# Patient Record
Sex: FEMALE | Race: BLACK OR AFRICAN AMERICAN | NOT HISPANIC OR LATINO | Employment: OTHER | URBAN - METROPOLITAN AREA
[De-identification: names, ages, dates, MRNs, and addresses within clinical notes are randomized per-mention and may not be internally consistent; named-entity substitution may affect disease eponyms.]

---

## 2017-08-04 ENCOUNTER — HOSPITAL ENCOUNTER (EMERGENCY)
Facility: HOSPITAL | Age: 47
Discharge: HOME | End: 2017-08-04
Payer: COMMERCIAL

## 2017-08-04 VITALS
RESPIRATION RATE: 16 BRPM | WEIGHT: 191 LBS | HEART RATE: 81 BPM | OXYGEN SATURATION: 98 % | BODY MASS INDEX: 29.98 KG/M2 | TEMPERATURE: 98 F | DIASTOLIC BLOOD PRESSURE: 84 MMHG | SYSTOLIC BLOOD PRESSURE: 167 MMHG | HEIGHT: 67 IN

## 2017-08-04 DIAGNOSIS — S60.459A FOREIGN BODY IN SKIN OF FINGER, INITIAL ENCOUNTER: Primary | ICD-10-CM

## 2017-08-04 DIAGNOSIS — S69.92XA: ICD-10-CM

## 2017-08-04 PROCEDURE — 10120 INC&RMVL FB SUBQ TISS SMPL: CPT | Mod: F1

## 2017-08-04 PROCEDURE — 99283 EMERGENCY DEPT VISIT LOW MDM: CPT | Mod: 25

## 2017-08-04 RX ORDER — LEVONORGESTREL / ETHINYL ESTRADIOL AND ETHINYL ESTRADIOL 150-30(84)
KIT ORAL DAILY
COMMUNITY

## 2017-08-04 NOTE — ED PROVIDER NOTES
Encounter Date: 8/4/2017       History     Chief Complaint   Patient presents with    Foreign Body in Skin     Fishing hook to 2nd digit on L hand. Tetanus UTD     45 yo BF c/o fish hook to left index finger distal phalanx about 2 hours ago.     Pt on vacation from [place] and is a [employer]et    Tetanus shot about 1 year ago      The history is provided by the patient.   Foreign Body    The current episode started 1 to 2 hours ago. The incident was witnessed. The incident was witnessed/reported by the patient. Associated symptoms comments: Finger pain only.     Review of patient's allergies indicates:  No Known Allergies  History reviewed. No pertinent past medical history.  History reviewed. No pertinent surgical history.  History reviewed. No pertinent family history.  Social History   Substance Use Topics    Smoking status: Never Smoker    Smokeless tobacco: Never Used    Alcohol use Yes      Comment: occasionaly      Review of Systems   Constitutional: Negative.    Musculoskeletal: Positive for myalgias.   Skin: Positive for wound.   Neurological: Negative.        Physical Exam     Initial Vitals [08/04/17 1452]   BP Pulse Resp Temp SpO2   (!) 167/84 81 16 98.4 °F (36.9 °C) 98 %      MAP       111.67         Physical Exam    Nursing note and vitals reviewed.  Constitutional: She appears well-developed and well-nourished. She is not diaphoretic. No distress.   HENT:   Head: Normocephalic and atraumatic.   Mouth/Throat: Oropharynx is clear and moist.   Eyes: Conjunctivae are normal.   Neck: Normal range of motion.   Cardiovascular: Intact distal pulses.   Pulmonary/Chest: No respiratory distress.   Musculoskeletal:        Hands:  Neurological: She is alert and oriented to person, place, and time.   Skin: Skin is warm and dry. Capillary refill takes less than 2 seconds. No rash noted.         ED Course   Foreign Body  Date/Time: 8/4/2017 3:55 PM  Performed by: CY RODRIGUEZ  Authorized by: CY RODRIGUEZ    Consent Done: Yes  Consent given by: patient  Patient understanding: patient states understanding of the procedure being performed  Intake: left index finger distal phalanx.  Anesthesia: local infiltration    Anesthesia:  Local Anesthetic: bupivacaine 0.5% without epinephrine  Anesthetic total: 3 mL  Patient sedated: no  Patient restrained: no  Complexity: simple  1 objects recovered.  Objects recovered: fish hook  Post-procedure assessment: foreign body removed  Patient tolerance: Patient tolerated the procedure well with no immediate complications  Comments: Removed easily.  Did not have to push through      Labs Reviewed - No data to display                    hook was clean and unbaited. No fish to that point or water    Tetanus UTD    No abx at this time        ED Course     Clinical Impression:   The primary encounter diagnosis was Foreign body in skin of finger, initial encounter. A diagnosis of Fish hook injury of left index finger, initial encounter was also pertinent to this visit.                           Nikko Thomson PA-C  08/04/17 [id number]